# Patient Record
Sex: MALE
[De-identification: names, ages, dates, MRNs, and addresses within clinical notes are randomized per-mention and may not be internally consistent; named-entity substitution may affect disease eponyms.]

---

## 2020-03-05 PROBLEM — Z00.00 ENCOUNTER FOR PREVENTIVE HEALTH EXAMINATION: Status: ACTIVE | Noted: 2020-03-05

## 2020-03-09 ENCOUNTER — APPOINTMENT (OUTPATIENT)
Dept: ORTHOPEDIC SURGERY | Facility: CLINIC | Age: 30
End: 2020-03-09
Payer: COMMERCIAL

## 2020-03-09 VITALS
BODY MASS INDEX: 32.62 KG/M2 | DIASTOLIC BLOOD PRESSURE: 92 MMHG | WEIGHT: 203 LBS | HEIGHT: 66 IN | HEART RATE: 87 BPM | TEMPERATURE: 99 F | SYSTOLIC BLOOD PRESSURE: 141 MMHG

## 2020-03-09 DIAGNOSIS — Z78.9 OTHER SPECIFIED HEALTH STATUS: ICD-10-CM

## 2020-03-09 PROCEDURE — 99203 OFFICE O/P NEW LOW 30 MIN: CPT

## 2020-03-09 PROCEDURE — 73120 X-RAY EXAM OF HAND: CPT | Mod: 50

## 2020-03-09 RX ORDER — METHYLPREDNISOLONE 4 MG/1
4 TABLET ORAL
Qty: 1 | Refills: 0 | Status: ACTIVE | COMMUNITY
Start: 2020-03-09 | End: 1900-01-01

## 2020-03-10 ENCOUNTER — APPOINTMENT (OUTPATIENT)
Dept: ORTHOPEDIC SURGERY | Facility: CLINIC | Age: 30
End: 2020-03-10
Payer: COMMERCIAL

## 2020-03-10 VITALS
SYSTOLIC BLOOD PRESSURE: 142 MMHG | HEIGHT: 66 IN | WEIGHT: 203 LBS | DIASTOLIC BLOOD PRESSURE: 92 MMHG | BODY MASS INDEX: 32.62 KG/M2 | HEART RATE: 96 BPM

## 2020-03-10 DIAGNOSIS — M25.541 PAIN IN JOINTS OF RIGHT HAND: ICD-10-CM

## 2020-03-10 DIAGNOSIS — M25.542 PAIN IN JOINTS OF RIGHT HAND: ICD-10-CM

## 2020-03-10 PROCEDURE — 99214 OFFICE O/P EST MOD 30 MIN: CPT

## 2020-03-10 NOTE — HISTORY OF PRESENT ILLNESS
[FreeTextEntry1] : 29-year-old male presents for evaluation of bilateral hand and upper extremity pain. His symptoms have been present for approximately 2 months he denies trauma or inciting event. He complains of pain,he is unable to describe the exact nature of the pain in bilateral hands with radiation to the upper extremities. His symptoms are worse with activity such as working as well as at night.

## 2020-03-10 NOTE — PHYSICAL EXAM
[Tinel's Sign Median Nerve At The Wrist (Carpal Tunnel)] : negative Tinel's [Wrist Neurological Dysfunction Phalen's Maneuver Bilateral] : negative Phalen's [Normal RUE] : Right Upper Extremity: No scars, rashes, lesions, ulcers, skin intact [Normal LUE] : Left Upper Extremity: No scars, rashes, lesions, ulcers, skin intact [Normal Finger/nose] : finger to nose coordination [Normal] : no peripheral adenopathy appreciated [de-identified] : bilateral hands:\par Skin intact no swelling no erythema no ecchymosis\par range of motion of the digits and wrist intact although associated with some diffuse pain\par Mild tenderness to palpation at the A1 pulley of the left index and middle fingers, no triggering\par Negative Finkelstein, negative grind\par sensation intact distally, capillary refill less than 2 seconds [de-identified] : ronaldr [de-identified] :  3 x-ray views of bilateral hands are reviewed, there is no osseous abnormality

## 2020-03-10 NOTE — ASSESSMENT
[FreeTextEntry1] : 29-year-old male with bilateral hand pain The exact etiology of his symptoms is unclear, due to the nighttime symptoms it may be consistent with carpal tunnel syndrome and I recommend EMG for further workup. He'll use wrist immobilizers at night for symptomatic relief. He'll followup to review the results of the emg.

## 2020-03-12 NOTE — PHYSICAL EXAM
[de-identified] : Right Fingers/Hand: \par Range of Motion in Degrees:\par                                                                                                 Claimant:  	 Normal:  \par \par Thumb Interphalangeal Hyperextension/Flexion		15H/80		15H/80\par \par Thumb Metacarpophalangeal Hyperextension/Flexion	10/55		10/55\par \par Finger DIP Joints Extension/Flexion		                 0/80		0/80\par \par Finger PIP Joints Extension/Flexion 			0/100		0/100\par \par Finger MCP Joints Hyperextension/Flexion 		(0-45H)/90	(0-45H)/90\par \par \par FDS, FDP intact.  Positive triggering of index and long fingers.  Positive tenderness at the A1 pulley.  No instability to varus or valgus stress.  No gross neurologic or vascular deficits distally.  Less than two second capillary refill.  Skin is intact.  No rashes, scars or lesions.\par \par Left Fingers/Hand: \par Range of Motion in Degrees:\par                                                                                                 Claimant:  	 Normal:  \par \par Thumb Interphalangeal Hyperextension/Flexion		15H/80		15H/80\par \par Thumb Metacarpophalangeal Hyperextension/Flexion	10/55		10/55\par \par Finger DIP Joints Extension/Flexion		                 0/80		0/80\par \par Finger PIP Joints Extension/Flexion 			0/100		0/100\par \par Finger MCP Joints Hyperextension/Flexion 		(0-45H)/90	(0-45H)/90\par \par \par FDS, FDP intact.  Positive triggering of long and ring fingers.  Positive tenderness at the A1 pulley.  No instability to varus or valgus stress.  No gross neurologic or vascular deficits distally.  Less than two second capillary refill.  Skin is intact.  No rashes, scars or lesions.\par  [de-identified] : Ambulating with a normal gait.  Station:  Normal.  [de-identified] : General Appearance:  Well-developed, well-nourished male in no acute distress. \par  [de-identified] : Radiographs, two views of the bilateral hands, show moderate degenerative change.

## 2020-03-12 NOTE — ADDENDUM
[FreeTextEntry1] : This note was written by Kandice Bennett on 03/12/2020 acting as scribe for Jose Lazaro III, MD

## 2020-03-12 NOTE — HISTORY OF PRESENT ILLNESS
[(no relief)  0] : the relief from medicine is 0/10 (no relief) [de-identified] : The patient comes in today with multiple fingers triggering, especially the index finger and long finger on the right and the long finger and ring finger on the left.  The patient states the onset/injury occurred on 2/15/2020.  The patient states the pain is localized.  The patient describes the pain as burning, achy and sharp.  The patient states bending and holding something makes the pain worse.\par \par Pain level includes a current pain level of 9/10. [] : No [de-identified] : Ibuprofen

## 2020-03-12 NOTE — DISCUSSION/SUMMARY
[de-identified] : At this time, he was placed on a Medrol Dosepak for the bilateral hand arthritis and trigger fingers.  He is being referred to Dr. Arita.

## 2020-03-19 ENCOUNTER — APPOINTMENT (OUTPATIENT)
Dept: NEUROLOGY | Facility: CLINIC | Age: 30
End: 2020-03-19

## 2020-03-20 RX ORDER — MELOXICAM 15 MG/1
15 TABLET ORAL
Qty: 30 | Refills: 2 | Status: ACTIVE | COMMUNITY
Start: 2020-03-20 | End: 1900-01-01

## 2020-03-25 ENCOUNTER — NON-APPOINTMENT (OUTPATIENT)
Age: 30
End: 2020-03-25